# Patient Record
Sex: FEMALE
[De-identification: names, ages, dates, MRNs, and addresses within clinical notes are randomized per-mention and may not be internally consistent; named-entity substitution may affect disease eponyms.]

---

## 2019-02-04 ENCOUNTER — HOSPITAL ENCOUNTER (INPATIENT)
Dept: HOSPITAL 92 - L&D | Age: 34
LOS: 3 days | Discharge: HOME | End: 2019-02-07
Attending: OBSTETRICS & GYNECOLOGY | Admitting: OBSTETRICS & GYNECOLOGY
Payer: COMMERCIAL

## 2019-02-04 VITALS — BODY MASS INDEX: 33.9 KG/M2

## 2019-02-04 DIAGNOSIS — D64.9: ICD-10-CM

## 2019-02-04 DIAGNOSIS — Z3A.37: ICD-10-CM

## 2019-02-04 DIAGNOSIS — O30.043: ICD-10-CM

## 2019-02-04 LAB
HBSAG INDEX: 0.18 S/CO (ref 0–0.99)
HGB BLD-MCNC: 10.6 G/DL (ref 12–16)
MCH RBC QN AUTO: 25.9 PG (ref 27–31)
MCV RBC AUTO: 78 FL (ref 78–98)
PLATELET # BLD AUTO: 117 THOU/UL (ref 130–400)
RBC # BLD AUTO: 4.07 MILL/UL (ref 4.2–5.4)
SYPHILIS ANTIBODY INDEX: 0.03 S/CO
WBC # BLD AUTO: 6.7 THOU/UL (ref 4.8–10.8)

## 2019-02-04 PROCEDURE — 87340 HEPATITIS B SURFACE AG IA: CPT

## 2019-02-04 PROCEDURE — 86780 TREPONEMA PALLIDUM: CPT

## 2019-02-04 PROCEDURE — 86900 BLOOD TYPING SEROLOGIC ABO: CPT

## 2019-02-04 PROCEDURE — 36415 COLL VENOUS BLD VENIPUNCTURE: CPT

## 2019-02-04 PROCEDURE — 85027 COMPLETE CBC AUTOMATED: CPT

## 2019-02-04 PROCEDURE — 51702 INSERT TEMP BLADDER CATH: CPT

## 2019-02-04 PROCEDURE — 86850 RBC ANTIBODY SCREEN: CPT

## 2019-02-04 PROCEDURE — 86901 BLOOD TYPING SEROLOGIC RH(D): CPT

## 2019-02-04 RX ADMIN — DOCUSATE CALCIUM SCH: 240 CAPSULE, LIQUID FILLED ORAL at 22:59

## 2019-02-04 NOTE — OP
DATE OF PROCEDURE:  2019



PREOPERATIVE DIAGNOSES:  

1. A 33-year-old white female, G3, P2, at 37 weeks and 6 days with dichorionic di in

twin gestation. 

2. Pregnancy-induced hypertension.

3. Breech presentation.



POSTOPERATIVE DIAGNOSES:  

1. A 33-year-old white female, G3, P2, at 37 weeks and 6 days with dichorionic di in

twin gestation. 

2. Pregnancy-induced hypertension.

3. Breech presentation.



PROCEDURE PERFORMED:  Primary low transverse  section without extension.



ASSISTANT SURGEON:  Renetta Taylor PA-C.



ANESTHESIA:  Spinal block.



ESTIMATED BLOOD LOSS:  750 mL.



COMPLICATIONS:  None.



COUNTS:  Correct x2.



ANTIBIOTICS:  2 g Ancef on-call to OR.



FINDINGS:  

1. Twin A male, breech presentation, Apgars 8 and 9.  Birth weight 6 pounds 11

ounces. 

2. Twin B female, breech presentation, Apgars 7 and 9.  Birth weight 5 pounds 8

ounces. Clear amniotic fluid noted. 

3. Two separate placentas noted.  Normal uterus, fallopian tubes, and ovaries.  Twin

B with triple nuchal cord noted. 



DISPOSITION:  Recovery room, stable.



DESCRIPTION OF OPERATIVE PROCEDURE:  The patient previously received informed

consent in regard to surgery.  She was taken back to the operating room, where she

received a spinal block without complications.  She was placed in the supine

position after a spinal block had been administered.  She was prepped and draped in

the usual sterile fashion.  At this time, a Pfannenstiel incision was made in the

lower abdomen and carried down the fascia.  Fascia was nicked in the midline.

Fascial incision was extended bilaterally with curved Moreira scissors.  The rectus

fascia was dissected both superiorly and inferiorly off the rectus muscle bellies

with Moreira scissors.  The rectus muscle bellies were divided in midline.  Peritoneal

cavity was entered.  Brennen O retractor was placed.  A 2 cm hysterotomy incision was

made in the lower uterine segment.  This extended via finger fractionation.  The

amniotic bag of twin A was ruptured, the 2 feet of twin B were grasped and these

were easily brought through the hysterotomy incision.  A moistened towel was placed

around the torso of the baby and a corkscrewing technique grasping the sacral area

was utilized to deliver each arm atraumatically and my assistant kept flexion on the

uterus, keeping the head flexed with easy delivery of the fetal head.  The cord was

doubly clamped and cut.  The mouth and nares of the infant bulb suctioned on the

abdomen, and the baby was handed to the  team in attendance.  Usual cord

blood was obtained.  Twin B was then delivered after presentation was confirmed, 2

feet were also noted and the lower back.  The amniotic bag was ruptured.  Again, the

feet were brought through the hysterotomy incision.  A moistened towel was placed

around the torso of the baby and the hands were placed at the sacral promontory and

buttocks region and a gentle traction with twisting motion allowed easy delivery of

each arm and the head delivered easily with flexion by my assistant.  A triple

nuchal cord was noted.  This was then wrapped.  The cord was doubly clamped and cut

and the baby was handed to the remigio team in attendance.  Usual cord blood was

obtained.  Placentas were then manually extracted.  Uterus was externalized.  The

uterus was cleaned of any remaining placental fragments with dry laparotomy sponge.

The hysterotomy incision was then closed in running locking fashion with #1 Monocryl

suture with good hemostasis noted.  The uterus returned back in the abdomen.  The

hysterotomy incision again was inspected.  Hemostasis was confirmed.  A double-layer

closure had been performed.  The Brennen O retractor was removed.  Rectus muscle

bellies were inspected and noted to be hemostatic prior to fascial closure.  The

fascia was closed with 0 PDS suture x2 in a running continuous fashion.

Subcutaneous tissue was irrigated and noted to be hemostatic and was reapproximated

with 3-0 running plain gut.  The skin was then closed with staples.  Surgery

terminated.  No anesthetic or surgical complications 

Occurred.







Job ID:  [id number]

## 2019-02-05 LAB
HGB BLD-MCNC: 8 G/DL (ref 12–16)
MCH RBC QN AUTO: 25.9 PG (ref 27–31)
MCV RBC AUTO: 79.8 FL (ref 78–98)
PLATELET # BLD AUTO: 81 THOU/UL (ref 130–400)
RBC # BLD AUTO: 3.1 MILL/UL (ref 4.2–5.4)
WBC # BLD AUTO: 10.4 THOU/UL (ref 4.8–10.8)

## 2019-02-05 RX ADMIN — DOCUSATE CALCIUM SCH MG: 240 CAPSULE, LIQUID FILLED ORAL at 22:31

## 2019-02-05 RX ADMIN — DOCUSATE CALCIUM SCH MG: 240 CAPSULE, LIQUID FILLED ORAL at 08:49

## 2019-02-05 RX ADMIN — HYDROCODONE BITARTRATE AND ACETAMINOPHEN PRN TAB: 5; 325 TABLET ORAL at 18:07

## 2019-02-05 RX ADMIN — HYDROCODONE BITARTRATE AND ACETAMINOPHEN PRN TAB: 5; 325 TABLET ORAL at 13:36

## 2019-02-05 RX ADMIN — SIMETHICONE PRN MG: 80 TABLET, CHEWABLE ORAL at 08:49

## 2019-02-05 RX ADMIN — HYDROCODONE BITARTRATE AND ACETAMINOPHEN PRN TAB: 5; 325 TABLET ORAL at 08:50

## 2019-02-05 RX ADMIN — SIMETHICONE PRN MG: 80 TABLET, CHEWABLE ORAL at 22:31

## 2019-02-05 NOTE — PDOC.PP
Post Partum Progress Note


Post Partum Day #: 1


PO intake tolerated: yes


Flatus: yes


Ambulation: yes


 Vital Signs (12 hours)











  Temp Pulse Resp BP Pulse Ox


 


 02/05/19 03:40  98.2 F  75  18  139/75 


 


 02/05/19 00:06  97.9 F  95  18  129/81  98


 


 02/04/19 20:15  97.6 F  82  16  146/76 H  94 L








 Weight











Weight                         210 lb

















- Physical Examination


Cardiovascular: no m/r/g, RRR


Respiratory: clear to auscultation bilaterally, non-labored breathing


Abdominal: + bowel sounds, lochia, no distention, appropriately TTP


Result Diagrams: 


 02/04/19 11:15





Additional Labs: 


 Post Partum Labs











Blood Type  O POSITIVE   02/04/19  11:15    


 


Hep Bs Antigen  Non-Reactive S/CO (NonReactive)   02/04/19  11:15    














- Assessment/Plan





Post op day 1 from primary c/s for twins-breech and mild PIH


Doing well. HCT pendeing. Routine post op care. Blood pressures improved.

## 2019-02-06 RX ADMIN — HYDROCODONE BITARTRATE AND ACETAMINOPHEN PRN TAB: 5; 325 TABLET ORAL at 06:20

## 2019-02-06 RX ADMIN — HYDROCODONE BITARTRATE AND ACETAMINOPHEN PRN TAB: 5; 325 TABLET ORAL at 00:35

## 2019-02-06 RX ADMIN — SIMETHICONE PRN MG: 80 TABLET, CHEWABLE ORAL at 05:18

## 2019-02-06 RX ADMIN — DOCUSATE CALCIUM SCH MG: 240 CAPSULE, LIQUID FILLED ORAL at 10:22

## 2019-02-06 RX ADMIN — HYDROCODONE BITARTRATE AND ACETAMINOPHEN PRN TAB: 5; 325 TABLET ORAL at 14:14

## 2019-02-06 RX ADMIN — DOCUSATE CALCIUM SCH MG: 240 CAPSULE, LIQUID FILLED ORAL at 22:09

## 2019-02-06 NOTE — PDOC.PP
Post Partum Progress Note


Post Partum Day #: 2


PO intake tolerated: yes


Flatus: yes


Ambulation: yes


 Vital Signs (12 hours)











  Temp Pulse Resp BP Pulse Ox


 


 02/06/19 05:15  97.9 F  81  18  147/81 H 


 


 02/05/19 20:15  98.0 F  81  16  131/77  95








 Weight











Weight                         210 lb

















- Physical Examination


Abdominal: + bowel sounds, lochia, no distention, appropriately TTP


Extremities: negative homans (B)


Result Diagrams: 


 02/05/19 06:10





Additional Labs: 


 Post Partum Labs











Blood Type  O POSITIVE   02/04/19  11:15    


 


Hep Bs Antigen  Non-Reactive S/CO (NonReactive)   02/04/19  11:15    














- Assessment/Plan





Post op day 2. Progressing well. Asymptomatic anemia. Iron replacement. 

Possible discharge 2/7 ...

## 2019-02-07 VITALS — DIASTOLIC BLOOD PRESSURE: 80 MMHG | SYSTOLIC BLOOD PRESSURE: 138 MMHG | TEMPERATURE: 98 F

## 2019-02-07 RX ADMIN — HYDROCODONE BITARTRATE AND ACETAMINOPHEN PRN TAB: 5; 325 TABLET ORAL at 02:51

## 2019-02-07 RX ADMIN — HYDROCODONE BITARTRATE AND ACETAMINOPHEN PRN TAB: 5; 325 TABLET ORAL at 09:36

## 2019-02-07 RX ADMIN — SIMETHICONE PRN MG: 80 TABLET, CHEWABLE ORAL at 09:31

## 2019-02-07 RX ADMIN — DOCUSATE CALCIUM SCH MG: 240 CAPSULE, LIQUID FILLED ORAL at 09:31

## 2019-02-07 RX ADMIN — HYDROCODONE BITARTRATE AND ACETAMINOPHEN PRN TAB: 5; 325 TABLET ORAL at 13:17

## 2019-02-07 NOTE — PDOC.PP
Post Partum Progress Note


Post Partum Day #: 3


Subjective: 





Pumping is going well. Has milk production. Baby B is on bili lights.


PO intake tolerated: yes


Flatus: yes


Ambulation: yes


 Vital Signs (12 hours)











  Temp Pulse Resp BP Pulse Ox


 


 02/06/19 20:20  98.2 F  82  20  139/78  96








 Weight











Weight                         210 lb

















- Physical Examination


Abdominal: + bowel sounds, lochia, no distention, appropriately TTP


Extremities: negative homans (B)


Result Diagrams: 


 02/05/19 06:10





Additional Labs: 


 Post Partum Labs











Blood Type  O POSITIVE   02/04/19  11:15    


 


Hep Bs Antigen  Non-Reactive S/CO (NonReactive)   02/04/19  11:15    














- Assessment/Plan





Post op day 3 from primary c/s for twins. Possible discharge later today 

pending status of baby B...


rx for norco will be sent to Pharmacy.